# Patient Record
Sex: FEMALE | Race: WHITE | NOT HISPANIC OR LATINO | URBAN - METROPOLITAN AREA
[De-identification: names, ages, dates, MRNs, and addresses within clinical notes are randomized per-mention and may not be internally consistent; named-entity substitution may affect disease eponyms.]

---

## 2018-10-26 PROBLEM — 31704005 RESIDUAL HEMORRHOIDAL SKIN TAG: Status: ACTIVE | Noted: 2018-10-24

## 2018-10-26 PROBLEM — 721704005 SECOND DEGREE HEMORRHOIDS: Status: ACTIVE | Noted: 2018-10-24

## 2018-10-26 PROBLEM — 92343006 BENIGN NEOPLASM OF SIGMOID COLON: Status: ACTIVE | Noted: 2018-10-24

## 2018-10-26 PROBLEM — 91985000 BENIGN NEOPLASM OF ASCENDING COLON: Status: ACTIVE | Noted: 2018-10-24

## 2018-10-26 PROBLEM — 92040001 BENIGN NEOPLASM OF CECUM: Status: ACTIVE | Noted: 2018-10-24

## 2020-06-02 ENCOUNTER — TELEPHONE ENCOUNTER (OUTPATIENT)
Dept: URBAN - METROPOLITAN AREA CLINIC 35 | Facility: CLINIC | Age: 75
End: 2020-06-02

## 2020-06-08 ENCOUNTER — OFFICE VISIT (OUTPATIENT)
Dept: URBAN - METROPOLITAN AREA MEDICAL CENTER 10 | Facility: MEDICAL CENTER | Age: 75
End: 2020-06-08

## 2020-06-08 RX ORDER — APIXABAN 5 MG/1
AS DIRECTED TABLET, FILM COATED ORAL
Status: ACTIVE | COMMUNITY

## 2020-06-08 RX ORDER — SODIUM SULFATE, POTASSIUM SULFATE, MAGNESIUM SULFATE 17.5; 3.13; 1.6 G/ML; G/ML; G/ML
AS DIRECTED SOLUTION, CONCENTRATE ORAL ONCE
Qty: 1 KIT | Refills: 0 | Status: ACTIVE | COMMUNITY
Start: 2020-05-12

## 2020-06-08 RX ORDER — SODIUM SULFATE, POTASSIUM SULFATE, MAGNESIUM SULFATE 17.5; 3.13; 1.6 G/ML; G/ML; G/ML
AS DIRECTED SOLUTION, CONCENTRATE ORAL ONCE
Qty: 1 KIT | Refills: 0 | Status: ACTIVE | COMMUNITY
Start: 2020-05-20

## 2020-06-08 RX ORDER — TRAMADOL HYDROCHLORIDE 50 MG/1
1 TABLET AS NEEDED TABLET, FILM COATED ORAL ONCE A DAY
Status: ON HOLD | COMMUNITY

## 2020-06-08 RX ORDER — FOLIC ACID 0.8 MG
1 TABLET TABLET ORAL ONCE A DAY
Status: ON HOLD | COMMUNITY

## 2020-06-08 RX ORDER — VITAMIN K2 90 MCG
1 CAPSULE CAPSULE ORAL ONCE A DAY
Status: ACTIVE | COMMUNITY

## 2020-06-08 RX ORDER — BIOTIN 1 MG
1 TABLET TABLET ORAL ONCE A DAY
Status: ON HOLD | COMMUNITY

## 2020-06-10 ENCOUNTER — TELEPHONE ENCOUNTER (OUTPATIENT)
Dept: URBAN - METROPOLITAN AREA CLINIC 35 | Facility: CLINIC | Age: 75
End: 2020-06-10

## 2020-06-18 PROBLEM — 49650001: Status: ACTIVE | Noted: 2020-05-19

## 2020-06-23 ENCOUNTER — OFFICE VISIT (OUTPATIENT)
Dept: URBAN - METROPOLITAN AREA CLINIC 35 | Facility: CLINIC | Age: 75
End: 2020-06-23

## 2020-06-24 PROBLEM — 428283002 HISTORY OF POLYP OF COLON (SITUATION): Status: ACTIVE | Noted: 2018-10-24

## 2020-06-24 PROBLEM — 89362005: Status: ACTIVE | Noted: 2020-05-12

## 2020-06-24 PROBLEM — 430813008 FAMILY HISTORY OF MALIGNANT NEOPLASM OF DIGESTIVE ORGAN: Status: ACTIVE | Noted: 2018-10-24

## 2020-06-24 PROBLEM — 62315008: Status: ACTIVE | Noted: 2020-05-12

## 2020-06-24 PROBLEM — 79922009 EPIGASTRIC PAIN: Status: ACTIVE | Noted: 2018-10-26

## 2020-06-24 PROBLEM — 37657006: Status: ACTIVE | Noted: 2020-06-18

## 2020-06-24 PROBLEM — 443503005: Status: ACTIVE | Noted: 2020-06-18

## 2020-06-24 PROBLEM — 92076000: Status: ACTIVE | Noted: 2020-06-18

## 2020-06-24 PROBLEM — 267010000: Status: ACTIVE | Noted: 2020-06-18

## 2020-06-24 PROBLEM — 398050005 DIVERTICULAR DISEASE OF COLON: Status: ACTIVE | Noted: 2018-10-24

## 2020-06-25 ENCOUNTER — OFFICE VISIT (OUTPATIENT)
Dept: URBAN - METROPOLITAN AREA CLINIC 37 | Facility: CLINIC | Age: 75
End: 2020-06-25

## 2020-06-25 ENCOUNTER — TELEPHONE ENCOUNTER (OUTPATIENT)
Dept: URBAN - METROPOLITAN AREA CLINIC 35 | Facility: CLINIC | Age: 75
End: 2020-06-25

## 2020-06-25 PROBLEM — 196735001: Status: ACTIVE | Noted: 2020-06-25

## 2020-06-25 RX ORDER — LOPERAMIDE HCL 2 MG/1
1 TABLET AS NEEDED TABLET, FILM COATED ORAL EVERY 8 HOURS
Qty: 90 TABLET | Refills: 3 | OUTPATIENT
Start: 2020-06-24

## 2020-06-25 RX ORDER — OMEPRAZOLE 20 MG/1
1 CAPSULE 30 MINUTES BEFORE MORNING MEAL CAPSULE, DELAYED RELEASE ORAL
Qty: 30 | Refills: 2 | OUTPATIENT
Start: 2020-06-25

## 2020-06-25 RX ORDER — APIXABAN 5 MG/1
AS DIRECTED TABLET, FILM COATED ORAL
Status: ACTIVE | COMMUNITY

## 2020-06-25 RX ORDER — VITAMIN K2 90 MCG
1 CAPSULE CAPSULE ORAL ONCE A DAY
Status: ACTIVE | COMMUNITY

## 2020-06-25 RX ORDER — FOLIC ACID 0.8 MG
1 TABLET TABLET ORAL ONCE A DAY
Status: ON HOLD | COMMUNITY

## 2020-06-25 RX ORDER — BIOTIN 1 MG
1 TABLET TABLET ORAL ONCE A DAY
Status: ON HOLD | COMMUNITY

## 2020-06-25 RX ORDER — HYOSCYAMINE SULFATE 0.12 MG/1
1 TABLET AS NEEDED TABLET ORAL
Qty: 90 TABLET | Refills: 2 | OUTPATIENT
Start: 2020-06-25

## 2020-06-25 RX ORDER — TRAMADOL HYDROCHLORIDE 50 MG/1
1 TABLET AS NEEDED TABLET, FILM COATED ORAL ONCE A DAY
Status: ON HOLD | COMMUNITY

## 2020-06-25 NOTE — EXAM-MIGRATED EXAMINATIONS
GENERAL APPEARANCE: - alert, in no acute distress, well developed, well nourished;   HEAD: - normocephalic, atraumatic;   EYES: - sclera anicteric bilaterally;   EARS: - normal;   THROAT: - clear;   NECK/THYROID: - neck supple, full range of motion, no cervical lymphadenopathy, no thyroid nodules, no thyromegaly, trachea midline;   SKIN: - no suspicious lesions, warm and dry, no spider angiomata, palmar erythema or icterus;   HEART: - no murmurs, regular rate and rhythm, S1, S2 normal;   LUNGS: - clear to auscultation bilaterally, good air movement, no wheezes, rales, rhonchi;   ABDOMEN: - bowel sounds present, no masses palpable, no organomegaly , no rebound tenderness, soft, nontender, nondistended;   RECTAL: - deferred by patient;   MUSCULOSKELETAL: - normal posture, normal gait and station, no decreased range of motion;   EXTREMITIES: - no clubbing, cyanosis, or edema;   NEUROLOGIC: - cranial nerves 2-12 grossly intact;   PSYCH: - cooperative with exam, mood/affect full range;   FOOT EXAM: -  ;   DENTAL EXAM: -  ;   BMI not documented -  ;   Weight Assessment -  ;

## 2020-06-25 NOTE — HPI-MIGRATED HPI
Interim investigations : Labs done on: ->  5/20/20: see below 6/8/20 at [facility]: - Glu 102; Na 142; BUN 15; Cr 0.77 5/8/20 at [facility]: - CMP: Glu 95; Na 136; BUN 22; Cr 0.93; Alb 3.5; Tbili 1.2 (H) - CBC: WBC 8.6; RBC 4.49; Hgb 14.9 (H); Hct 44.8 (H); MCV 99.8 (H); MCH 33.2 (H); Abs neut 6.51 (H);   Interim investigations : Imaging studies: ->  * CT scan Abd/pelvis on 5/26/20 with : 1. Postsurgical changes of gastric bypass surgery w/o complication 2. Difusse hepatic steatosis 3. Diffuse colonic wall thickening attributable to changes of chronic diverticular disease or incomplete distention. Mild colitis is not completely excluded;   Follow up OV : Patient is here for a routine OV for -> weight loss and diarrhea Last OV was 6 weeks ago Had labs and stool test done and here to discuss results Also had CT scan with Dr Gunderson on 5/8/20, see below Current BM: 4-8x loose stool daily since her RNY surgery Still has periumbilical abd pain with foods;   Post-op OV : Patient -> denies any new changes in his/her health status since last OV;   Post-op OV : After Colonoscopy on -> 6/8/2020, at which time 2 small tubular adenoma polyps were found and removed. The examined portion of the ileum was normal. Random colon bxx showed colonic mucosa with no histopathologic change, no acute, chronic or microscopic colitis. Diverticulosis was also noted in the descending and sigmoid colon. Bowel prep was fair;   Post-op OV : After EGD on -> the same day, the esophagus appeared normal with irregular GE junction. Distal esophageal bxx showed squamous epithelium with regenerative features, but no columnar mucosa. The stomach appeared normal with gastric bxx showing mild chronic gastritis and regenerative changes, but no H. Pylori or IM. S/p RNY gastrojejunostomy with GJ anastomosis characterized by erythema. Examined jejunum was normal with jejunal bxx showing small intestinal mucosa with intact villous architecture and no histopathologic change. No endoscopic evidence of pathology in UGI tract to explain abd pain;   Post-op OV : Patient denies -> rectal bleeding, fever, nausea & vomiting since the procedure date;

## 2021-07-22 ENCOUNTER — OFFICE VISIT (OUTPATIENT)
Dept: URBAN - METROPOLITAN AREA CLINIC 27 | Facility: CLINIC | Age: 76
End: 2021-07-22

## 2021-07-22 PROBLEM — 88111009 CHANGE IN BOWEL HABIT: Status: INACTIVE | Noted: 2021-07-22

## 2021-07-22 PROBLEM — 441509002 CARDIAC PACEMAKER IN SITU: Status: ACTIVE | Noted: 2021-07-22

## 2021-07-22 PROBLEM — 16932000 NAUSEA AND VOMITING: Status: ACTIVE | Noted: 2021-07-22

## 2021-07-22 PROBLEM — 266998003 HISTORY OF PEPTIC ULCER: Status: ACTIVE | Noted: 2021-07-22

## 2021-07-22 PROBLEM — 608848006 HISTORY OF BARIATRIC SURGICAL PROCEDURE: Status: ACTIVE | Noted: 2021-07-22

## 2021-07-23 ENCOUNTER — LAB OUTSIDE AN ENCOUNTER (OUTPATIENT)
Dept: URBAN - METROPOLITAN AREA CLINIC 121 | Facility: CLINIC | Age: 76
End: 2021-07-23

## 2021-07-23 LAB
A/G RATIO: (no result)
ALBUMIN: 3.8
ALK PHOS: 85
AMYLASE: 30
BASOPH COUNT: (no result)
BASOPHIL %: 0.4
BG RANDOM: 101
BILI TOTAL: 0.9
BUN/CREAT: (no result)
BUN: 25
CALCIUM: 9.3
CHLORIDE: 103
CO2: 27
CREATININE: 0.62
EOS COUNT: (no result)
EOSINOPHIL %: 0.8
GLOBULIN TOT: (no result)
HCT: 44.8
HGB: 14.4
HGBA1C: (no result)
LYMPHS %: 29.6
MCH: 31.2
MCHC: (no result)
MCV: 97
MONOCYTE %: 7.5
MONOSCT AUTO: (no result)
PLATELETS: (no result)
PMN %: 61.7
POTASSIUM: 4.2
PROTEIN, TOT: 6.4
RBC: (no result)
RDW: 13
SGOT (AST): 22
SGPT (ALT): 17
WBC: (no result)
ZZ-GE-UNK: (no result)
ZZ-GE-UNK: (no result)

## 2021-08-03 ENCOUNTER — OFFICE VISIT (OUTPATIENT)
Dept: URBAN - METROPOLITAN AREA SURGERY CENTER 7 | Facility: SURGERY CENTER | Age: 76
End: 2021-08-03

## 2021-09-16 ENCOUNTER — OFFICE VISIT (OUTPATIENT)
Dept: URBAN - METROPOLITAN AREA CLINIC 27 | Facility: CLINIC | Age: 76
End: 2021-09-16

## 2021-09-17 PROBLEM — 40739000 DYSPHAGIA: Status: ACTIVE | Noted: 2021-09-17

## 2021-09-17 PROBLEM — 102614006 GENERALIZED ABDOMINAL PAIN: Status: ACTIVE | Noted: 2021-09-17

## 2021-09-17 PROBLEM — 3696007 FUNCTIONAL DYSPEPSIA: Status: ACTIVE | Noted: 2021-09-17

## 2021-09-17 PROBLEM — 266435005 GASTRO-ESOPHAGEAL REFLUX DISEASE WITHOUT ESOPHAGITIS: Status: ACTIVE | Noted: 2021-09-17

## 2021-09-17 PROBLEM — 449671000124104 LONG-TERM CURRENT USE OF ANTITHROMBOTIC: Status: ACTIVE | Noted: 2021-09-17

## 2021-09-17 PROBLEM — 80774000 HELICOBACTER PYLORI: Status: ACTIVE | Noted: 2021-09-17

## 2021-09-17 PROBLEM — 422587007 NAUSEA: Status: ACTIVE | Noted: 2021-09-17

## 2021-09-17 PROBLEM — 62315008 DIARRHEA: Status: ACTIVE | Noted: 2021-09-17

## 2021-09-22 ENCOUNTER — OFFICE VISIT (OUTPATIENT)
Dept: URBAN - METROPOLITAN AREA SURGERY CENTER 7 | Facility: SURGERY CENTER | Age: 76
End: 2021-09-22

## 2021-10-25 ENCOUNTER — OFFICE VISIT (OUTPATIENT)
Dept: URBAN - METROPOLITAN AREA SURGERY CENTER 7 | Facility: SURGERY CENTER | Age: 76
End: 2021-10-25

## 2021-11-09 ENCOUNTER — LAB OUTSIDE AN ENCOUNTER (OUTPATIENT)
Dept: URBAN - METROPOLITAN AREA CLINIC 121 | Facility: CLINIC | Age: 76
End: 2021-11-09

## 2021-11-09 LAB — DIG. LEVEL: (no result)

## 2022-04-30 ENCOUNTER — TELEPHONE ENCOUNTER (OUTPATIENT)
Dept: URBAN - METROPOLITAN AREA CLINIC 121 | Facility: CLINIC | Age: 77
End: 2022-04-30

## 2022-04-30 RX ORDER — ONDANSETRON HYDROCHLORIDE 4 MG/1
TAKE 1 TABLET BY MOUTH EVERY SIX HOURS TAKE 1-2 TABLET PO Q6H PRN NAUSEA TABLET, FILM COATED ORAL
OUTPATIENT
Start: 2021-08-30 | End: 2022-02-26

## 2022-04-30 RX ORDER — NITAZOXANIDE 500 MG/1
1 TABLET PO BID X 14 DAYS TABLET ORAL
OUTPATIENT
Start: 2021-07-26 | End: 2021-08-09

## 2022-04-30 RX ORDER — ONDANSETRON HYDROCHLORIDE 4 MG/1
TAKE 1 TABLET PO Q6H PRN NAUSEA TABLET, FILM COATED ORAL
OUTPATIENT
Start: 2021-08-03 | End: 2021-08-30

## 2022-04-30 RX ORDER — SUCRALFATE 1 G/1
TAKE 1 TABLET BY MOUTH FOUR TIMES A DAY TAKE 1 TABLET PO 4 A DAY TABLET ORAL
OUTPATIENT
Start: 2021-08-27 | End: 2021-11-25

## 2022-04-30 RX ORDER — LEVOFLOXACIN 250 MG
1 TABLET PO QAM X 14 DAYS TABLET ORAL
OUTPATIENT
Start: 2021-07-26 | End: 2021-08-02

## 2022-04-30 RX ORDER — HYOSCYAMINE SULFATE 0.12 MG/1
TAKE 1 TABLET BY MOUTH EVERY SIX HOURS TAKE 1-2 TABLET PO Q6H PEN TABLET ORAL
OUTPATIENT
Start: 2021-08-30 | End: 2021-12-28

## 2022-04-30 RX ORDER — HYOSCYAMINE SULFATE 0.12 MG/1
TAKE 1-2 TABLETS PO Q6H PRN ABD PAIN TABLET ORAL
OUTPATIENT
Start: 2021-07-22

## 2022-04-30 RX ORDER — SUCRALFATE 1 G/1
TAKE 1 TABLET BY MOUTH FOUR TIMES A DAY TABLET ORAL
OUTPATIENT
Start: 2021-08-30 | End: 2021-11-28

## 2022-04-30 RX ORDER — ONDANSETRON HYDROCHLORIDE 4 MG/1
TAKE 1 TABLET PO Q6H PRN NAUSEA TABLET, FILM COATED ORAL
OUTPATIENT
Start: 2021-08-03

## 2022-04-30 RX ORDER — OMEPRAZOLE 20 MG/1
1 CAPSULE PO BID X 14 DAYS CAPSULE, DELAYED RELEASE ORAL
OUTPATIENT
Start: 2021-07-26 | End: 2021-08-09

## 2022-04-30 RX ORDER — DOXYCYCLINE HYCLATE 100 MG/1
TAKE 1 TABLET AT NIGHT FOR 14 DAYS TABLET ORAL
OUTPATIENT
Start: 2021-07-26 | End: 2021-08-09

## 2022-04-30 RX ORDER — HYOSCYAMINE SULFATE 0.12 MG/1
TAKE 1-2 TABLETS PO Q6H PRN ABD PAIN TABLET ORAL
OUTPATIENT
Start: 2021-07-22 | End: 2021-08-30

## 2022-04-30 RX ORDER — SUCRALFATE 1 G/1
TAKE 1 TABLET BY MOUTH FOUR TIMES A DAY TAKE 1 TABLET PO 4 A DAY TABLET ORAL
OUTPATIENT
Start: 2021-08-27 | End: 2021-08-30

## 2022-05-01 ENCOUNTER — TELEPHONE ENCOUNTER (OUTPATIENT)
Dept: URBAN - METROPOLITAN AREA CLINIC 121 | Facility: CLINIC | Age: 77
End: 2022-05-01

## 2022-05-01 RX ORDER — OMEPRAZOLE MAGNESIUM, AMOXICILLIN AND RIFABUTIN 10; 250; 12.5 MG/1; MG/1; MG/1
4 CAPSULE PO Q8H X14 DAYS CAPSULE, DELAYED RELEASE ORAL
Status: ACTIVE | COMMUNITY
Start: 2021-07-27

## 2022-05-01 RX ORDER — FUROSEMIDE 40 MG/1
TABLET ORAL
Status: ACTIVE | COMMUNITY
Start: 2021-07-22

## 2022-05-01 RX ORDER — APIXABAN 2.5 MG/1
TABLET, FILM COATED ORAL
Status: ACTIVE | COMMUNITY
Start: 2021-07-22

## 2023-03-21 ENCOUNTER — WEB ENCOUNTER (OUTPATIENT)
Dept: URBAN - METROPOLITAN AREA CLINIC 115 | Facility: CLINIC | Age: 78
End: 2023-03-21

## 2023-03-21 ENCOUNTER — DASHBOARD ENCOUNTERS (OUTPATIENT)
Age: 78
End: 2023-03-21

## 2023-03-21 ENCOUNTER — OFFICE VISIT (OUTPATIENT)
Dept: URBAN - METROPOLITAN AREA CLINIC 115 | Facility: CLINIC | Age: 78
End: 2023-03-21
Payer: MEDICARE

## 2023-03-21 ENCOUNTER — LAB OUTSIDE AN ENCOUNTER (OUTPATIENT)
Dept: URBAN - METROPOLITAN AREA CLINIC 115 | Facility: CLINIC | Age: 78
End: 2023-03-21

## 2023-03-21 VITALS
HEART RATE: 87 BPM | DIASTOLIC BLOOD PRESSURE: 71 MMHG | TEMPERATURE: 95.4 F | SYSTOLIC BLOOD PRESSURE: 114 MMHG | HEIGHT: 65 IN | WEIGHT: 133 LBS | BODY MASS INDEX: 22.16 KG/M2

## 2023-03-21 DIAGNOSIS — R10.33 PERIUMBILICAL PAIN: ICD-10-CM

## 2023-03-21 DIAGNOSIS — R63.4 UNINTENTIONAL WEIGHT LOSS: ICD-10-CM

## 2023-03-21 DIAGNOSIS — K52.9 CHRONIC DIARRHEA: ICD-10-CM

## 2023-03-21 DIAGNOSIS — R41.0 CONFUSION: ICD-10-CM

## 2023-03-21 PROCEDURE — 99204 OFFICE O/P NEW MOD 45 MIN: CPT | Performed by: INTERNAL MEDICINE

## 2023-03-21 RX ORDER — OMEPRAZOLE MAGNESIUM, AMOXICILLIN AND RIFABUTIN 10; 250; 12.5 MG/1; MG/1; MG/1
4 CAPSULE PO Q8H X14 DAYS CAPSULE, DELAYED RELEASE ORAL
Status: ACTIVE | COMMUNITY
Start: 2021-07-27

## 2023-03-21 RX ORDER — TRAMADOL HYDROCHLORIDE 50 MG/1
1 TABLET AS NEEDED TABLET, FILM COATED ORAL ONCE A DAY
Status: ON HOLD | COMMUNITY

## 2023-03-21 RX ORDER — VITAMIN K2 90 MCG
1 CAPSULE CAPSULE ORAL ONCE A DAY
Status: ACTIVE | COMMUNITY

## 2023-03-21 RX ORDER — APIXABAN 5 MG/1
AS DIRECTED TABLET, FILM COATED ORAL
Status: ACTIVE | COMMUNITY

## 2023-03-21 RX ORDER — LOPERAMIDE HYDROCHLORIDE 2 MG/1
1 CAPSULE AS NEEDED CAPSULE ORAL TWICE DAILY
Qty: 60 | Refills: 1 | OUTPATIENT
Start: 2023-03-21

## 2023-03-21 RX ORDER — FOLIC ACID 0.8 MG
1 TABLET TABLET ORAL ONCE A DAY
Status: ON HOLD | COMMUNITY

## 2023-03-21 RX ORDER — HYOSCYAMINE SULFATE 0.12 MG/1
1 TABLET AS NEEDED TABLET ORAL
Qty: 90 TABLET | Refills: 2 | Status: ACTIVE | COMMUNITY
Start: 2020-06-25

## 2023-03-21 RX ORDER — APIXABAN 2.5 MG/1
TABLET, FILM COATED ORAL
Status: ACTIVE | COMMUNITY
Start: 2021-07-22

## 2023-03-21 RX ORDER — FUROSEMIDE 40 MG/1
TABLET ORAL
Status: ACTIVE | COMMUNITY
Start: 2021-07-22

## 2023-03-21 RX ORDER — BIOTIN 1 MG
1 TABLET TABLET ORAL ONCE A DAY
Status: ON HOLD | COMMUNITY

## 2023-03-21 RX ORDER — LOPERAMIDE HCL 2 MG/1
1 TABLET AS NEEDED TABLET, FILM COATED ORAL EVERY 8 HOURS
Qty: 90 TABLET | Refills: 3 | Status: ACTIVE | COMMUNITY
Start: 2020-06-24

## 2023-03-21 NOTE — HPI-TODAY'S VISIT:
Patient of Dr. Eric Yuan, had EGD and Colonoscopy with him in 2020 without etiology for symptoms . nml TI, nml random colon bx, nml SB bx, gastritis, no hp. hx REGGIE gas bypass. 25 lb weight loss. chr diarrhea, worse at night. poor historian, tangential. living in senior facility. brought stool in sytofoam cup to visit, aggravated toward staff who told her that can not be used for any lab sample.

## 2023-03-23 ENCOUNTER — TELEPHONE ENCOUNTER (OUTPATIENT)
Dept: URBAN - METROPOLITAN AREA CLINIC 35 | Facility: CLINIC | Age: 78
End: 2023-03-23

## 2023-03-23 LAB
A/G RATIO: 1.4
ABSOLUTE BASOPHILS: 9
ABSOLUTE EOSINOPHILS: 41
ABSOLUTE LYMPHOCYTES: 1835
ABSOLUTE MONOCYTES: 322
ABSOLUTE NEUTROPHILS: 2392
ALBUMIN: 3.6
ALKALINE PHOSPHATASE: 111
ALT (SGPT): 21
AST (SGOT): 23
BASOPHILS: 0.2
BILIRUBIN, TOTAL: 0.7
BUN/CREATININE RATIO: (no result)
BUN: 21
C-REACTIVE PROTEIN, QUANT: <0.2
CALCIUM: 9
CARBON DIOXIDE, TOTAL: 31
CHLORIDE: 104
CREATININE: 0.78
EGFR: 78
EOSINOPHILS: 0.9
FOLATE (FOLIC ACID), SERUM: 16.1
GLOBULIN, TOTAL: 2.6
GLUCOSE: 85
HEMATOCRIT: 40.7
HEMOGLOBIN: 13.4
IMMUNOGLOBULIN A, QN, SERUM: 302
INTERPRETATION: (no result)
LIPASE: 39
LYMPHOCYTES: 39.9
MCH: 32.4
MCHC: 32.9
MCV: 98.3
MONOCYTES: 7
MPV: 10.4
NEUTROPHILS: 52
PLATELET COUNT: 172
POTASSIUM: 4.1
PROTEIN, TOTAL: 6.2
RDW: 12.4
RED BLOOD CELL COUNT: 4.14
SODIUM: 142
T-TRANSGLUTAMINASE (TTG) IGA: <1
TSH W/REFLEX TO FT4: 1.55
VITAMIN B12: 391
WHITE BLOOD CELL COUNT: 4.6

## 2023-03-24 ENCOUNTER — TELEPHONE ENCOUNTER (OUTPATIENT)
Dept: URBAN - METROPOLITAN AREA CLINIC 115 | Facility: CLINIC | Age: 78
End: 2023-03-24

## 2023-03-24 ENCOUNTER — LAB OUTSIDE AN ENCOUNTER (OUTPATIENT)
Dept: URBAN - METROPOLITAN AREA CLINIC 115 | Facility: CLINIC | Age: 78
End: 2023-03-24